# Patient Record
Sex: MALE | Race: WHITE
[De-identification: names, ages, dates, MRNs, and addresses within clinical notes are randomized per-mention and may not be internally consistent; named-entity substitution may affect disease eponyms.]

---

## 2017-10-01 ENCOUNTER — HOSPITAL ENCOUNTER (EMERGENCY)
Dept: HOSPITAL 61 - ER | Age: 32
Discharge: HOME | End: 2017-10-01
Payer: COMMERCIAL

## 2017-10-01 VITALS — BODY MASS INDEX: 35.79 KG/M2 | WEIGHT: 250 LBS | HEIGHT: 70 IN

## 2017-10-01 VITALS — SYSTOLIC BLOOD PRESSURE: 127 MMHG | DIASTOLIC BLOOD PRESSURE: 68 MMHG

## 2017-10-01 DIAGNOSIS — I10: ICD-10-CM

## 2017-10-01 DIAGNOSIS — J01.90: Primary | ICD-10-CM

## 2017-10-01 DIAGNOSIS — Z87.891: ICD-10-CM

## 2017-10-01 DIAGNOSIS — K21.9: ICD-10-CM

## 2017-10-01 LAB
ALBUMIN SERPL-MCNC: 3.7 G/DL (ref 3.4–5)
ALP SERPL-CCNC: 76 U/L (ref 46–116)
ALT SERPL-CCNC: 27 U/L (ref 16–63)
ANION GAP SERPL CALC-SCNC: 7 MMOL/L (ref 6–14)
AST SERPL-CCNC: 16 U/L (ref 15–37)
BASOPHILS # BLD AUTO: 0.1 X10^3/UL (ref 0–0.2)
BASOPHILS NFR BLD: 1 % (ref 0–3)
BILIRUB DIRECT SERPL-MCNC: < 0.1 MG/DL (ref 0–0.2)
BILIRUB SERPL-MCNC: 0.2 MG/DL (ref 0.2–1)
BUN SERPL-MCNC: 11 MG/DL (ref 8–26)
CALCIUM SERPL-MCNC: 9.3 MG/DL (ref 8.5–10.1)
CHLORIDE SERPL-SCNC: 104 MMOL/L (ref 98–107)
CK SERPL-CCNC: 91 U/L (ref 39–308)
CKMB MASS: 0.6 NG/ML (ref 0–3.6)
CO2 SERPL-SCNC: 28 MMOL/L (ref 21–32)
CREAT SERPL-MCNC: 0.9 MG/DL (ref 0.7–1.3)
EOSINOPHIL NFR BLD: 5 % (ref 0–3)
ERYTHROCYTE [DISTWIDTH] IN BLOOD BY AUTOMATED COUNT: 14.1 % (ref 11.5–14.5)
GFR SERPLBLD BASED ON 1.73 SQ M-ARVRAT: 97.8 ML/MIN
GLUCOSE SERPL-MCNC: 107 MG/DL (ref 70–99)
HCT VFR BLD CALC: 40.9 % (ref 39–53)
HGB BLD-MCNC: 13.6 G/DL (ref 13–17.5)
LYMPHOCYTES # BLD: 3.2 X10^3/UL (ref 1–4.8)
LYMPHOCYTES NFR BLD AUTO: 38 % (ref 24–48)
MAGNESIUM SERPL-MCNC: 2.2 MG/DL (ref 1.8–2.4)
MCH RBC QN AUTO: 28 PG (ref 25–35)
MCHC RBC AUTO-ENTMCNC: 33 G/DL (ref 31–37)
MCV RBC AUTO: 84 FL (ref 79–100)
MONOCYTES NFR BLD: 8 % (ref 0–9)
NEUTROPHILS NFR BLD AUTO: 49 % (ref 31–73)
PLATELET # BLD AUTO: 399 X10^3/UL (ref 140–400)
POTASSIUM SERPL-SCNC: 3.7 MMOL/L (ref 3.5–5.1)
PROT SERPL-MCNC: 7.9 G/DL (ref 6.4–8.2)
RBC # BLD AUTO: 4.9 X10^6/UL (ref 4.3–5.7)
SODIUM SERPL-SCNC: 139 MMOL/L (ref 136–145)
WBC # BLD AUTO: 8.7 X10^3/UL (ref 4–11)

## 2017-10-01 PROCEDURE — 36415 COLL VENOUS BLD VENIPUNCTURE: CPT

## 2017-10-01 PROCEDURE — 84484 ASSAY OF TROPONIN QUANT: CPT

## 2017-10-01 PROCEDURE — 80048 BASIC METABOLIC PNL TOTAL CA: CPT

## 2017-10-01 PROCEDURE — 85025 COMPLETE CBC W/AUTO DIFF WBC: CPT

## 2017-10-01 PROCEDURE — 80076 HEPATIC FUNCTION PANEL: CPT

## 2017-10-01 PROCEDURE — 93005 ELECTROCARDIOGRAM TRACING: CPT

## 2017-10-01 PROCEDURE — 83735 ASSAY OF MAGNESIUM: CPT

## 2017-10-01 PROCEDURE — 70450 CT HEAD/BRAIN W/O DYE: CPT

## 2017-10-01 PROCEDURE — 82553 CREATINE MB FRACTION: CPT

## 2017-10-01 NOTE — PHYS DOC
Past Medical History


Past Medical History:  GERD, Hypertension


Past Surgical History:  No Surgical History


Alcohol Use:  None


Drug Use:  Marijuana





Adult General


Chief Complaint


Chief Complaint:  OTHER COMPLAINTS





HPI


HPI





Patient is a 32  year old male brought to the ED by family members. Patient 

states "I woke up feeling drunk and use the". He complains of pain on the left 

side of his head. He was concerned that the symptoms might be related to his 

blood pressure.





Patient states that one week ago he was driving, he felt a "pop" on the left 

side of his head, he had never had this before. He felt "severe anxiety". He 

went to the ER in [facility] and was diagnosed with elevated blood 

pressure. He had a CT scan of his head and blood work which were reported to be 

normal. Since then, he feels like he is been dizzy all the time. It is not 

described as a vertigo type dizziness. It is not positional. One time it was 

worsened by eating a candy bar. He "feels drunk", like he feels off balance 

when he is walking. He saw his doctor about 5 days ago and was started on a new 

blood pressure medicine which she has been taking as directed. He believes his 

blood pressure in the office was about 148/90 something.





Also, they started him on Flonase nasal spray a week ago because they thought 

some of the symptoms were due to allergies. He has been using it as directed, 

he feels like some of his symptoms have improved.





Patient and family are concerned because his dad had an MI in his 30s and  

at the age of 43 related to cardiac causes. His sister has a cardiac history. 

The patient quit smoking. He does not have diabetes to his knowledge.





PCP Dr. Shannon


He was started on lisinopril 5 days ago, to take one half daily for 10 days and 

then one whole daily. He has been taking as directed.


He also takes nortriptyline at bedtime for some chronic stomach pain condition, 

that was prescribed by a GI doctor at [facility].


He also was prescribed Flonase nasal spray a week ago and has been taking daily.





Review of Systems


Review of Systems





Constitutional: Denies fever or chills []


HENT: He had some nasal congestion and postnasal drainage which has improved 

after initiation of Flonase


Respiratory: Denies cough or shortness of breath []


Cardiovascular: As in history of present illness for history, he has not had 

chest pain


GI: As in history of present illness


: Denies dysuria or hematuria []


Musculoskeletal: Denies back pain or joint pain []


Integument: Denies rash or skin lesions []


Neurologic: As in history of present illness





Allergies


Allergies





Allergies








Coded Allergies Type Severity Reaction Last Updated Verified


 


  No Known Drug Allergies    16 No











Physical Exam


Physical Exam





Constitutional: Well developed, well nourished, no acute distress, non-toxic 

appearance. Blood pressure 147/84, heart rate 65, pulse ox on room air 96%


HENT: Normocephalic, atraumatic, bilateral external ears normal, bilateral EACs 

normal, bilateral TMs normal, oropharynx moist, no oral exudates, nose normal. [

]


Eyes: PERRLA, EOMI, conjunctiva normal, no discharge. [] 


Neck: Normal range of motion,  supple, no stridor. [] 


Cardiovascular:Heart rate regular rhythm, no murmur []


Lungs & Thorax:  Bilateral breath sounds clear to auscultation []


Skin: Warm, dry, no erythema, no rash. [] 


Extremities: No tenderness, no cyanosis, no clubbing, ROM intact, no edema. [] 


Neurologic: Alert and oriented X 3, normal motor function,  no focal deficits 

noted. []





Current Patient Data


Vital Signs





 Vital Signs








  Date Time  Temp Pulse Resp B/P (MAP) Pulse Ox O2 Delivery O2 Flow Rate FiO2


 


10/1/17 16:03 97.7 58 20 147/84 (105) 93 Room Air  





 97.7       








Lab Values





 Laboratory Tests








Test


  10/1/17


16:13


 


White Blood Count


  8.7 x10^3/uL


(4.0-11.0)


 


Red Blood Count


  4.90 x10^6/uL


(4.30-5.70)


 


Hemoglobin


  13.6 g/dL


(13.0-17.5)


 


Hematocrit


  40.9 %


(39.0-53.0)


 


Mean Corpuscular Volume


  84 fL ()


 


 


Mean Corpuscular Hemoglobin 28 pg (25-35)  


 


Mean Corpuscular Hemoglobin


Concent 33 g/dL


(31-37)


 


Red Cell Distribution Width


  14.1 %


(11.5-14.5)


 


Platelet Count


  399 x10^3/uL


(140-400)


 


Neutrophils (%) (Auto) 49 % (31-73)  


 


Lymphocytes (%) (Auto) 38 % (24-48)  


 


Monocytes (%) (Auto) 8 % (0-9)  


 


Eosinophils (%) (Auto) 5 % (0-3)  H


 


Basophils (%) (Auto) 1 % (0-3)  


 


Neutrophils # (Auto)


  4.2 x10^3uL


(1.8-7.7)


 


Lymphocytes # (Auto)


  3.2 x10^3/uL


(1.0-4.8)


 


Monocytes # (Auto)


  0.7 x10^3/uL


(0.0-1.1)


 


Eosinophils # (Auto)


  0.5 x10^3/uL


(0.0-0.7)


 


Basophils # (Auto)


  0.1 x10^3/uL


(0.0-0.2)


 


Sodium Level


  139 mmol/L


(136-145)


 


Potassium Level


  3.7 mmol/L


(3.5-5.1)


 


Chloride Level


  104 mmol/L


()


 


Carbon Dioxide Level


  28 mmol/L


(21-32)


 


Anion Gap 7 (6-14)  


 


Blood Urea Nitrogen


  11 mg/dL


(8-26)


 


Creatinine


  0.9 mg/dL


(0.7-1.3)


 


Estimated GFR


(Cockcroft-Gault) 97.8  


 


 


Glucose Level


  107 mg/dL


(70-99)  H


 


Calcium Level


  9.3 mg/dL


(8.5-10.1)


 


Magnesium Level


  2.2 mg/dL


(1.8-2.4)


 


Total Bilirubin


  0.2 mg/dL


(0.2-1.0)


 


Direct Bilirubin


  < 0.1 mg/dL


(0.0-0.2)


 


Aspartate Amino Transferase


(AST) 16 U/L (15-37)


 


 


Alanine Aminotransferase (ALT)


  27 U/L (16-63)


 


 


Alkaline Phosphatase


  76 U/L


()


 


Creatine Kinase


  91 U/L


()


 


Creatine Kinase MB (Mass)


  0.6 ng/mL


(0.0-3.6)


 


Creatine Kinase MB Relative


Index 0.7 % (0-4)  


 


 


Troponin I Quantitative


  < 0.017 ng/mL


(0.000-0.055)


 


Total Protein


  7.9 g/dL


(6.4-8.2)


 


Albumin


  3.7 g/dL


(3.4-5.0)





 Laboratory Tests


10/1/17 16:13








 Laboratory Tests


10/1/17 16:13














EKG


EKG


12-lead EKG read by me. Sinus rhythm. Heart rate 65. There are no acute ST or T 

wave changes indicative of ischemia or infarction. No STEMI. No rhythm 

disturbance. 1607[]





Radiology/Procedures


Radiology/Procedures


CT scan of the head read by the radiologist. No intracranial findings or 

abnormality. They did note some mucosal thickening in the maxillary and ethmoid 

sinuses.[]





Course & Med Decision Making


Course & Med Decision Making


Pertinent Labs and Imaging studies reviewed. (See chart for details)





32-year-old male brought by family for complaints of head pain and non-vertigo 

dizziness for about a week. He was concerned about his blood pressure but here 

in the ED it was 147/84, and I am not concerned about that since he has just 

started on an antihypertensive, and also I don't believe that level of blood 

pressure would be causing any of the symptoms. Labs are entirely unremarkable 

including cardiac. I don't believe the patient's complaints sound cardiac. CT 

scan did note some sinus mucosal thickening. He was recently started on Flonase 

which has helped some of his symptoms. I think it would be reasonable to treat 

him for sinus infection to see if that would improve his symptoms as well. See 

instructions for plan.





[]





Dragon Disclaimer


Dragon Disclaimer


This electronic medical record was generated, in whole or in part, using a 

voice recognition dictation system.





Departure


Departure


Impression:  


 Primary Impression:  


 Sinusitis, acute


Disposition:  01 HOME, SELF-CARE


Condition:  STABLE


Referrals:  


JONEL MARTINEZ (PCP)


Patient Instructions:  Sinusitis, Easy-to-Read





Additional Instructions:  


Today, your blood pressure was 147/84. I don't believe that would be causing 

your symptoms. Continue to take your blood pressure medicine as directed and 

follow up as directed with your primary care doctor.





Labs, including cardiac enzymes, EKG, and CT scan of the head did not show 

anything CT scan did show some thickened sinus mucosa which often is related to 

sinus infection. Continue to use Flonase as prescribed. We will treat you with 

antibiotics for possible sinus infection and see if this helps your symptoms.





Drink plenty of fluids. If your symptoms are not improving in 1-2 weeks, 

recheck with your doctor for further evaluation.


Scripts


Amoxicillin/Potassium Clav (AUGMENTIN 875-125 TABLET) 1 Each Tablet


1 TAB PO BID for sinus infection, #20 TAB


   Prov: WOODY FLORES MD         10/1/17











WOODY FLORES MD Oct 1, 2017 17:31

## 2017-10-01 NOTE — RAD
CT HEAD WO CONTRAST dated 10/1/2017 5:04 PM

 

Indication: > LT SD HEAD PAIN AND DIZZY X 1 WK, NO PRIORS 

 

Comparison: No comparison is available.

 

Technique: Noncontrast images were performed.

One or more of the following individualized dose reduction techniques were

utilized for this examination:  1. Automated exposure control  2. 

Adjustment of the mA and/or kV according to patient size  3. Use of 

iterative reconstruction technique

 

Findings: 

There is no apparent intracranial mass, hemorrhage or abnormal extra-axial

fluid collection. No area of abnormal density is seen in the brain. The 

ventricles and basilar cisterns are normally positioned. There is some 

mucosal thickening in the maxillary and ethmoid sinuses. The mastoid air 

cells are clear.   

 

IMPRESSION:

No evidence of acute intracranial abnormality.

 

Electronically signed by: Lloyd Funk Jr., MD (10/1/2017 5:19 PM) 

[facility]

## 2018-04-28 ENCOUNTER — HOSPITAL ENCOUNTER (EMERGENCY)
Dept: HOSPITAL 61 - ER | Age: 33
Discharge: HOME | End: 2018-04-28
Payer: COMMERCIAL

## 2018-04-28 DIAGNOSIS — J02.9: Primary | ICD-10-CM

## 2018-04-28 DIAGNOSIS — I10: ICD-10-CM

## 2018-04-28 DIAGNOSIS — K21.9: ICD-10-CM

## 2018-04-28 PROCEDURE — 99283 EMERGENCY DEPT VISIT LOW MDM: CPT

## 2025-04-08 NOTE — EKG
Addended by: ARTURO GERARDO on: 4/8/2025 11:09 AM     Modules accepted: Orders     [facility]

              [address] [place] [address]

Test Date:    2017-10-01               Test Time:    16:07:15

Pat Name:     ROMEO MUSA           Department:   

Patient ID:   [id number]           Room:          

Gender:       M                        Technician:   

:          [id number]               Requested By: WOODY FLORES

Order Number: [id number]            Reading MD:     

                                 Measurements

Intervals                              Axis          

Rate:         65                       P:            24

MD:           132                      QRS:          36

QRSD:         82                       T:            34

QT:           380                                    

QTc:          400                                    

                           Interpretive Statements

SINUS RHYTHM

QRS(T) CONTOUR ABNORMALITY

CONSIDER ANTEROSEPTAL MYOCARDIAL DAMAGE

RI6.01          Unconfirmed report

No previous ECG available for comparison